# Patient Record
(demographics unavailable — no encounter records)

---

## 2024-10-23 NOTE — IMAGING
[de-identified] : LSPINE Inspection: No rash or ecchymosis Palpation: R lumbar paraspinal muscles TTP ROM: Full with stiffness Strength: 5/5 bilateral hip flexors, knee extensors, ankle dorsiflexors, EHL, ankle plantarflexors Sensation: Sensation present to light touch bilateral L2-S1 distributions Provocative maneuvers: Negative bilateral straight raise

## 2024-10-23 NOTE — ASSESSMENT
[FreeTextEntry1] : Asymmetric collapse on Right L4/5  MRI Lumbar spine Follow up after MRI Discussed gabapentin, will continue taking

## 2024-10-23 NOTE — HISTORY OF PRESENT ILLNESS
[Lower back] : lower back [Dull/Aching] : dull/aching [Sharp] : sharp [Intermittent] : intermittent [de-identified] : 2/7/24- No reported injury: pain started about 2 weeks ago. Pain travels down the legs posteriorly, denies numbness/tingling. Patient states Tylenol helps with the pain. No bb dysfunction.   4/10/24-Lower back pain improving since last visit. Radiation to bilateral glutes/thighs, although less severe. In PT. No b/b dysfunction reported.  6/12/24- in PT. Overall better. Just R sided back ache.  8/14/24- Lower back pain has improved with PT, though was recently denied continued PT. Pt received CSI at last appointment, with excellent relief. No longer taking gabapentin. Of note, pt was recently hospitalized, had stents placed, CT imaging was significant for possible colonic malignancy, is following up with gastro. 10/23/24- continued lower back pain, taking gabapentin with slight relief. Still having radicular pain. Finished a course of PT.  [] : no

## 2024-11-13 NOTE — ASSESSMENT
[FreeTextEntry1] : Asymmetric collapse on Right L4/5 Central stenosis L3/4, L4/5, less so L5/S1, crest in L5  Discussed indications for surgery  Gabapentin- Patient advised of sedating effects, instructed not to drive, operate machinery, or take with other sedating medications. Advised of need to taper on/off medication and risk of abruptly stopping gabapentin.

## 2024-11-13 NOTE — DATA REVIEWED
[FreeTextEntry1] : MRI L Spine O&C 11/2/24 IMPRESSION: 1. Scoliosis, exaggerated lumbar lordosis, severe multilevel degenerative disc disease and disc hernation 2. Multilevel central stenosis, nerve root impingement, and foraminal narrowing throughout the lumbar spine most severe at L3-4 and L4-5 3. Transitional S1-2 disc segment 4. Scattered degenerative endplate changes without acute fracture 5. Degenerative changes in the lower thoracic spine.

## 2024-11-13 NOTE — HISTORY OF PRESENT ILLNESS
[Lower back] : lower back [Dull/Aching] : dull/aching [Sharp] : sharp [Intermittent] : intermittent [de-identified] : 11/2/82024 Lumbar MRI  - report noted in chart.   Ind. review- Central stenosis L3/4, L4/5, less so L5/S1, crest in L5  ======================================================================== 2/7/24- No reported injury: pain started about 2 weeks ago. Pain travels down the legs posteriorly, denies numbness/tingling. Patient states Tylenol helps with the pain. No bb dysfunction.   4/10/24-Lower back pain improving since last visit. Radiation to bilateral glutes/thighs, although less severe. In PT. No b/b dysfunction reported.  6/12/24- in PT. Overall better. Just R sided back ache.  8/14/24- Lower back pain has improved with PT, though was recently denied continued PT. Pt received CSI at last appointment, with excellent relief. No longer taking gabapentin. Of note, pt was recently hospitalized, had stents placed, CT imaging was significant for possible colonic malignancy, is following up with gastro. 10/23/24- continued lower back pain, taking gabapentin with slight relief. Still having radicular pain. Finished a course of PT.  11/13/24- sxs same. He is here to review MRI results. [] : no

## 2024-11-13 NOTE — IMAGING
[de-identified] : LSPINE Palpation: R lumbar paraspinal muscles TTP ROM: Full with stiffness Strength: 5/5 bilateral hip flexors, knee extensors, ankle dorsiflexors, EHL, ankle plantarflexors Sensation: Sensation present to light touch bilateral L2-S1 distributions Provocative maneuvers: Negative bilateral straight raise

## 2025-01-17 NOTE — HISTORY OF PRESENT ILLNESS
[Lower back] : lower back [Dull/Aching] : dull/aching [Sharp] : sharp [Intermittent] : intermittent [de-identified] : 11/2/82024 Lumbar MRI  - report noted in chart.   Ind. review- Central stenosis L3/4, L4/5, less so L5/S1, crest in L5  ======================================================================== 2/7/24- No reported injury: pain started about 2 weeks ago. Pain travels down the legs posteriorly, denies numbness/tingling. Patient states Tylenol helps with the pain. No bb dysfunction.   4/10/24-Lower back pain improving since last visit. Radiation to bilateral glutes/thighs, although less severe. In PT. No b/b dysfunction reported.  6/12/24- in PT. Overall better. Just R sided back ache.  8/14/24- Lower back pain has improved with PT, though was recently denied continued PT. Pt received CSI at last appointment, with excellent relief. No longer taking gabapentin. Of note, pt was recently hospitalized, had stents placed, CT imaging was significant for possible colonic malignancy, is following up with gastro. 10/23/24- continued lower back pain, taking gabapentin with slight relief. Still having radicular pain. Finished a course of PT.  11/13/24- sxs same. He is here to review MRI results. 1/17/25- here for follow-up, states that he has intermittent pain and is taking gabapentin with slight relief.  not interested in injections at this time. continues with HEP and acupuncture. Will continue with gabapentin prn [] : no

## 2025-01-17 NOTE — IMAGING
[de-identified] : LSPINE Palpation: R lumbar paraspinal muscles TTP ROM: Full with stiffness Strength: 5/5 bilateral hip flexors, knee extensors, ankle dorsiflexors, EHL, ankle plantarflexors Sensation: Sensation present to light touch bilateral L2-S1 distributions Provocative maneuvers: Negative bilateral straight raise

## 2025-04-02 NOTE — IMAGING
[de-identified] : LSPINE Palpation: R lumbar paraspinal muscles TTP ROM: Full with stiffness Strength: 5/5 bilateral hip flexors, knee extensors, ankle dorsiflexors, EHL, ankle plantarflexors Sensation: Sensation present to light touch bilateral L2-S1 distributions Provocative maneuvers: Negative bilateral straight raise

## 2025-04-02 NOTE — ASSESSMENT
[FreeTextEntry1] : Asymmetric collapse on Right L4/5 Central stenosis L3/4, L4/5, less so L5/S1, crest in L5  Discussed indications for surgery  Gabapentin- Patient advised of sedating effects, instructed not to drive, operate machinery, or take with other sedating medications. Advised of need to taper on/off medication and risk of abruptly stopping gabapentin.  Referral to pain management to discuss ESHA

## 2025-04-02 NOTE — HISTORY OF PRESENT ILLNESS
[Lower back] : lower back [Dull/Aching] : dull/aching [Sharp] : sharp [Intermittent] : intermittent [de-identified] : 11/2/82024 Lumbar MRI  - report noted in chart.   Ind. review- Central stenosis L3/4, L4/5, less so L5/S1, crest in L5  ======================================================================== 2/7/24- No reported injury: pain started about 2 weeks ago. Pain travels down the legs posteriorly, denies numbness/tingling. Patient states Tylenol helps with the pain. No bb dysfunction.   4/10/24-Lower back pain improving since last visit. Radiation to bilateral glutes/thighs, although less severe. In PT. No b/b dysfunction reported.  6/12/24- in PT. Overall better. Just R sided back ache.  8/14/24- Lower back pain has improved with PT, though was recently denied continued PT. Pt received CSI at last appointment, with excellent relief. No longer taking gabapentin. Of note, pt was recently hospitalized, had stents placed, CT imaging was significant for possible colonic malignancy, is following up with gastro. 10/23/24- continued lower back pain, taking gabapentin with slight relief. Still having radicular pain. Finished a course of PT.  11/13/24- sxs same. He is here to review MRI results. 1/17/25- here for follow-up, states that he has intermittent pain and is taking gabapentin with slight relief.  not interested in injections at this time. continues with HEP and acupuncture. Will continue with gabapentin prn 4/2/25- Here for lumbar follow up. He states his symptoms are about the same as last evaluation. He is taking Gabapentin with some relief. He is in a cardiac rehabilitation program. He is also taking Ibuprofen and Tylenol. He is interested in pain management.  [] : no

## 2025-04-08 NOTE — DISCUSSION/SUMMARY
[de-identified] : After discussing various treatment options with the patient including but not limited to oral medications, physical therapy, exercise modalities as well as interventional spinal injections, we have decided with the following plan:   - Continue Home exercises, stretching, activity modification, physical therapy, and conservative care. - MRI report and/or images was reviewed and discussed with the patient. - Recommend L4-5 Lumbar Epidural Steroid Injection under fluoroscopic guidance with image. - The risks, benefits and alternatives of the proposed procedure were explained in detail with the patient. The risks outlined include but are not limited to infection, bleeding, post-dural puncture headache, nerve injury, a temporary increase in pain, failure to resolve symptoms, allergic reaction, symptom recurrence, and possible elevation of blood sugar in diabetics. All questions were answered to patient's apparent satisfaction and he/she verbalized an understanding. - Patient is presenting with acute/sub-acute radicular pain with impairment in ADLs and functionality.  The pain has not responded to conservative care including NSAID therapy and/or physical therapy.  There is no bleeding tendency, unstable medical condition, or systemic infection. - Follow up in 1-2 weeks post injection for re-evaluation. - Patient is on anticoagulation therapy and needs medical clearance to stop medication for the procedure. (PLAVIX and ASA)  none

## 2025-04-08 NOTE — PHYSICAL EXAM
[de-identified] : Constitutional; Appears well, no apparent distress Ability to communicate: Normal  Respiratory: non-labored breathing Skin: No rash noted Head: Normocephalic, atraumatic Neck: no visible thyroid enlargement Eyes: Extraocular movements intact Neurologic: Alert and oriented x3 Psychiatric: normal mood, affect and behavior [] : non-antalgic

## 2025-04-08 NOTE — HISTORY OF PRESENT ILLNESS
[Lower back] : lower back [Buttock] : buttock [Dull/Aching] : dull/aching [Occasional] : occasional [Leisure] : leisure [Sitting] : sitting [Coughing] : coughing [FreeTextEntry1] : Initial HPI 04/08/2025: Pain started a few years ago and is on the BILATERAL lower back and radiates into the bilateral buttocks and sometimes down the legs described as a dull/aching pain. Saw Dr. Akins who recommended ESHA.   MRI Lumbar Spine independently reviewed:  L3-4, L4-5 moderate/severe central stenosis, mild stenosis at L5-S1  Conservative Care: has done PT  Pain Medications: Gabapentin 200mg QHS; Tylenol and Advil PRN  Past Injections: none Spine surgery: none  Blood thinners: *pt takes 81mg Aspirin and PLAVIX  [] : no [FreeTextEntry9] : acupuncture

## 2025-05-01 NOTE — PROCEDURE
[FreeTextEntry3] : Date of Service: 05/01/2025   Account: [id number]  Patient: MARINA CAMPBELL   YOB: 1956  Age: 69 year   Surgeon:                                                         Tone Rivers D.O.  Pre-Operative Diagnosis:                             Lumbosacral radiculitis  Post-Operative Diagnosis:                           Same  Procedure:                                                      Interlaminar lumbar epidural steroid injection (L4-5) under fluoroscopic guidance  Anesthesia:                                                     Local with MAC   This procedure was carried out using fluoroscopic guidance.  The risks and benefits of the procedure were discussed extensively with the patient.  The consent of the patient was obtained and the following procedure was performed.  The patient was placed in the prone position.  The lumbar area was prepped and draped in a sterile fashion.  A timeout was performed with all essential staff present and the site and side were verified. Under AP view with slight cephalad-caudad angulation, the L4-5 interspace was identified and marked.  Using sterile technique, the superficial skin was anesthetized with 1% Lidocaine without epinephrine.  A 20-gauge Tuohy needle was advanced into the epidural space under fluoroscopy using [id number] technique and using loss of resistance at the L4-5 level.  After negative aspiration for heme or CSF, an epidurogram was obtained using 2-3 cc Omnipaque contrast injected under live fluoroscopy, confirming epidural placement of the needle.    Epidurogram showed no evidence of intrathecal or intravascular flow, and good evidence of bilateral epidural flow from L3-S2 levels.  After this, 4 cc of preservative free normal saline plus 12 mg of betamethasone were injected into the epidural space.  The needle was subsequently removed.  Anesthesia personnel were present throughout the procedure.  The patient tolerated the procedure well and was instructed to contact me immediately if there were any problems.  Tone Rivers D.O.

## 2025-05-13 NOTE — PHYSICAL EXAM
[de-identified] : Constitutional; Appears well, no apparent distress Ability to communicate: Normal  Respiratory: non-labored breathing Skin: No rash noted Head: Normocephalic, atraumatic Neck: no visible thyroid enlargement Eyes: Extraocular movements intact Neurologic: Alert and oriented x3 Psychiatric: normal mood, affect and behavior [] : non-antalgic

## 2025-05-13 NOTE — DISCUSSION/SUMMARY
[de-identified] : After discussing various treatment options with the patient including but not limited to oral medications, physical therapy, exercise modalities as well as interventional spinal injections, we have decided with the following plan:  - Continue home exercises, stretching, activity modification, physical therapy, and conservative care. - Follow-up as needed. - Recommend continue Gabapentin 3100mg QHS. Pt instructed not to drive or operate heavy machinery while on medications. - Recommend Tylenol 500-1000mg Q8 hours PRN as he should not take NSAIDs as he is on the blood thinners.  - Patient is on anticoagulation therapy and needs medical clearance to stop medication for the procedure. (PLAVIX and ASA)

## 2025-05-13 NOTE — PHYSICAL EXAM
[de-identified] : Constitutional; Appears well, no apparent distress Ability to communicate: Normal  Respiratory: non-labored breathing Skin: No rash noted Head: Normocephalic, atraumatic Neck: no visible thyroid enlargement Eyes: Extraocular movements intact Neurologic: Alert and oriented x3 Psychiatric: normal mood, affect and behavior [] : non-antalgic

## 2025-05-13 NOTE — DISCUSSION/SUMMARY
[de-identified] : After discussing various treatment options with the patient including but not limited to oral medications, physical therapy, exercise modalities as well as interventional spinal injections, we have decided with the following plan:  - Continue home exercises, stretching, activity modification, physical therapy, and conservative care. - Follow-up as needed. - Recommend continue Gabapentin 3100mg QHS. Pt instructed not to drive or operate heavy machinery while on medications. - Recommend Tylenol 500-1000mg Q8 hours PRN as he should not take NSAIDs as he is on the blood thinners.  - Patient is on anticoagulation therapy and needs medical clearance to stop medication for the procedure. (PLAVIX and ASA)

## 2025-05-13 NOTE — HISTORY OF PRESENT ILLNESS
[Lower back] : lower back [Buttock] : buttock [Dull/Aching] : dull/aching [Occasional] : occasional [Leisure] : leisure [Sitting] : sitting [Coughing] : coughing [Meds] : meds [Injection therapy] : injection therapy [FreeTextEntry1] : 5/13/25: s/p L4-5 LESI on 5/01/25 with 80% relief and improvement of ADLs. Pain is still in the b/l buttock but much less severe.   Initial HPI 04/08/2025: Pain started a few years ago and is on the BILATERAL lower back and radiates into the bilateral buttocks and sometimes down the legs described as a dull/aching pain. Saw Dr. Akins who recommended ESHA.   MRI Lumbar Spine independently reviewed:  L3-4, L4-5 moderate/severe central stenosis, mild stenosis at L5-S1  Conservative Care: has done PT  Pain Medications: Gabapentin 200mg QHS; Tylenol and Advil PRN  Past Injections: none Spine surgery: none  Blood thinners: *pt takes 81mg Aspirin and PLAVIX  [] : no [FreeTextEntry9] : acupuncture

## 2025-07-11 NOTE — HISTORY OF PRESENT ILLNESS
[Lower back] : lower back [Dull/Aching] : dull/aching [Sharp] : sharp [Intermittent] : intermittent [de-identified] : 11/2/82024 Lumbar MRI  - report noted in chart.   Ind. review- Central stenosis L3/4, L4/5, less so L5/S1, crest in L5  ======================================================================== 2/7/24- No reported injury: pain started about 2 weeks ago. Pain travels down the legs posteriorly, denies numbness/tingling. Patient states Tylenol helps with the pain. No bb dysfunction.   4/10/24-Lower back pain improving since last visit. Radiation to bilateral glutes/thighs, although less severe. In PT. No b/b dysfunction reported.  6/12/24- in PT. Overall better. Just R sided back ache.  8/14/24- Lower back pain has improved with PT, though was recently denied continued PT. Pt received CSI at last appointment, with excellent relief. No longer taking gabapentin. Of note, pt was recently hospitalized, had stents placed, CT imaging was significant for possible colonic malignancy, is following up with gastro. 10/23/24- continued lower back pain, taking gabapentin with slight relief. Still having radicular pain. Finished a course of PT.  11/13/24- sxs same. He is here to review MRI results. 1/17/25- here for follow-up, states that he has intermittent pain and is taking gabapentin with slight relief.  not interested in injections at this time. continues with HEP and acupuncture. Will continue with gabapentin prn 4/2/25- Here for lumbar follow up. He states his symptoms are about the same as last evaluation. He is taking Gabapentin with some relief. He is in a cardiac rehabilitation program. He is also taking Ibuprofen and Tylenol. He is interested in pain management.  7/11/25-Patient here for f/u and states he saw Dr. Rivers and received an epidural last May and reports a little relief. He reports aching pain in his lower back. Patient reports he fell three days ago doing yard work and noticed swelling on his left leg. He states he has been taking gabapentin and reports a little relief.  [] : no

## 2025-07-11 NOTE — ASSESSMENT
[FreeTextEntry1] : Asymmetric collapse on Right L4/5 Central stenosis L3/4, L4/5, less so L5/S1, crest in L5  Discussed indications for surgery  Gabapentin- Patient advised of sedating effects, instructed not to drive, operate machinery, or take with other sedating medications. Advised of need to taper on/off medication and risk of abruptly stopping gabapentin.  c/w gabapentin Will follow up with Dr. Rivers when ready for LESI  TPI L paraspinal  Trigger Point Injection- The risks, benefits, contents and alternatives to injection were explained in full to the patient.  Risks outlined include but are not limited to infection, bleeding, scarring, skin discoloration, temporary increase in pain, syncopal episode, failure to resolve symptoms, allergic reaction, flare reaction, permanent white skin discoloration, symptom recurrence, and elevation of blood sugar in diabetics.  Patient understood the risks.  All questions were answered.  After discussion of options, patient requested an injection.  Oral informed consent was obtained and sterile prep was done of the injection site.  Sterile technique was used to introduce the mixture. The mixture consisted of:  4 cc 1% lidocaine 80  mg of depo  Patient tolerated the procedure well.  Patient advised to ice the injection site this evening.  Signs and symptoms of infection reviewed and patient advised to call immediately for redness, fevers, and/or chills.

## 2025-07-11 NOTE — IMAGING
[de-identified] : LSPINE Palpation: R lumbar paraspinal muscles TTP ROM: Full with stiffness Strength: 5/5 bilateral hip flexors, knee extensors, ankle dorsiflexors, EHL, ankle plantarflexors Sensation: Sensation present to light touch bilateral L2-S1 distributions Provocative maneuvers: Negative bilateral straight raise